# Patient Record
Sex: FEMALE | Race: WHITE | URBAN - METROPOLITAN AREA
[De-identification: names, ages, dates, MRNs, and addresses within clinical notes are randomized per-mention and may not be internally consistent; named-entity substitution may affect disease eponyms.]

---

## 2018-12-09 NOTE — PATIENT DISCUSSION
"""Recommend patient use sunglasses regularly Telephone Encounter by Ernestine Collins RN at 01/30/17 08:13 AM     Author:  Ernestine Collins RN Service:  (none) Author Type:  Registered Nurse     Filed:  01/30/17 08:15 AM Encounter Date:  1/27/2017 Status:  Signed     :  Ernestine Collins RN (Registered Nurse)            DR ANIRUDH GIRALDO---see pt's note.   Pt was offered First Trimester Screening (NT) at [facility] RN visit.   Not sure NIPT test? Offered Panorama testing? Pt would have to contact insurance for coverage.[KR1.1M]           Revision History        User Key Date/Time User Provider Type Action    > KR1.1 01/30/17 08:15 AM Ernestine Collins, RN Registered Nurse Sign    M - Manual

## 2019-04-01 NOTE — PATIENT DISCUSSION
"""D/w patient doing a gtt before bed does not help.  Rec patient start using thera tears 3-4 times ""

## 2019-04-22 NOTE — PATIENT DISCUSSION
"""Patient states drops are helping symptoms.  Rec patient cont using thera tears 4 times a day to ""

## 2020-04-20 NOTE — PATIENT DISCUSSION
"""MAC OCT OU done today. "" ""Follow drusen without treatment. Call if vision or distortion increases.  Recommend sunglasses

## 2021-04-20 NOTE — PATIENT DISCUSSION
"""Annual Type 2 Diabetic eye exam with dilation. No diabetic retinopathy found. Recommend annual dilated examinations. Patient instructed to call office immediately if sudden changes in vision occur. Emphasized importance of good blood glucose control. Summary of care provided to the physician managing the ongoing diabetes care. ""."

## 2023-04-17 ENCOUNTER — NEW PATIENT (OUTPATIENT)
Dept: URBAN - METROPOLITAN AREA CLINIC 1 | Facility: CLINIC | Age: 63
End: 2023-04-17

## 2023-04-17 DIAGNOSIS — H16.143: ICD-10-CM

## 2023-04-17 DIAGNOSIS — H04.123: ICD-10-CM

## 2023-04-17 DIAGNOSIS — E10.9: ICD-10-CM

## 2023-04-17 DIAGNOSIS — H26.493: ICD-10-CM

## 2023-04-17 PROCEDURE — 92004 COMPRE OPH EXAM NEW PT 1/>: CPT

## 2023-04-17 PROCEDURE — 92015 DETERMINE REFRACTIVE STATE: CPT

## 2023-04-17 ASSESSMENT — TONOMETRY
OS_IOP_MMHG: 11
OD_IOP_MMHG: 11

## 2023-04-17 ASSESSMENT — VISUAL ACUITY
OS_SC: 20/25-2
OU_SC: J5
OS_SC: J7
OD_SC: 20/40
OD_SC: J7

## 2024-07-29 ENCOUNTER — COMPREHENSIVE EXAM (OUTPATIENT)
Dept: URBAN - METROPOLITAN AREA CLINIC 1 | Facility: CLINIC | Age: 64
End: 2024-07-29

## 2024-07-29 DIAGNOSIS — H26.493: ICD-10-CM

## 2024-07-29 DIAGNOSIS — E10.9: ICD-10-CM

## 2024-07-29 DIAGNOSIS — H04.123: ICD-10-CM

## 2024-07-29 DIAGNOSIS — H16.143: ICD-10-CM

## 2024-07-29 DIAGNOSIS — Z96.1: ICD-10-CM

## 2024-07-29 PROCEDURE — 99214 OFFICE O/P EST MOD 30 MIN: CPT

## 2024-07-29 PROCEDURE — 92015 DETERMINE REFRACTIVE STATE: CPT

## 2024-07-29 ASSESSMENT — TONOMETRY
OD_IOP_MMHG: 12
OS_IOP_MMHG: 12

## 2024-07-29 ASSESSMENT — VISUAL ACUITY
OD_SC: 20/40-2
OS_BAT: 20/80
OD_BAT: 20/80
OS_SC: 20/30